# Patient Record
Sex: FEMALE | Race: ASIAN | NOT HISPANIC OR LATINO | URBAN - METROPOLITAN AREA
[De-identification: names, ages, dates, MRNs, and addresses within clinical notes are randomized per-mention and may not be internally consistent; named-entity substitution may affect disease eponyms.]

---

## 2019-06-06 ENCOUNTER — INPATIENT (INPATIENT)
Facility: HOSPITAL | Age: 49
LOS: 0 days | Discharge: HOME | DRG: 176 | End: 2019-06-06
Attending: INTERNAL MEDICINE | Admitting: INTERNAL MEDICINE
Payer: MEDICAID

## 2019-06-06 VITALS
TEMPERATURE: 98 F | OXYGEN SATURATION: 100 % | DIASTOLIC BLOOD PRESSURE: 88 MMHG | HEART RATE: 74 BPM | RESPIRATION RATE: 17 BRPM | SYSTOLIC BLOOD PRESSURE: 120 MMHG

## 2019-06-06 VITALS
OXYGEN SATURATION: 100 % | HEART RATE: 86 BPM | RESPIRATION RATE: 16 BRPM | HEIGHT: 62 IN | DIASTOLIC BLOOD PRESSURE: 87 MMHG | TEMPERATURE: 98 F | SYSTOLIC BLOOD PRESSURE: 143 MMHG | WEIGHT: 151.9 LBS

## 2019-06-06 DIAGNOSIS — I26.99 OTHER PULMONARY EMBOLISM WITHOUT ACUTE COR PULMONALE: ICD-10-CM

## 2019-06-06 DIAGNOSIS — Z29.9 ENCOUNTER FOR PROPHYLACTIC MEASURES, UNSPECIFIED: ICD-10-CM

## 2019-06-06 DIAGNOSIS — E11.9 TYPE 2 DIABETES MELLITUS WITHOUT COMPLICATIONS: ICD-10-CM

## 2019-06-06 LAB
ALBUMIN SERPL ELPH-MCNC: 3.4 G/DL — LOW (ref 3.5–5)
ALP SERPL-CCNC: 67 U/L — SIGNIFICANT CHANGE UP (ref 40–120)
ALT FLD-CCNC: 36 U/L DA — SIGNIFICANT CHANGE UP (ref 10–60)
ANION GAP SERPL CALC-SCNC: 6 MMOL/L — SIGNIFICANT CHANGE UP (ref 5–17)
APTT BLD: 31 SEC — SIGNIFICANT CHANGE UP (ref 27.5–36.3)
APTT BLD: 32 SEC — SIGNIFICANT CHANGE UP (ref 27.5–36.3)
AST SERPL-CCNC: 34 U/L — SIGNIFICANT CHANGE UP (ref 10–40)
BILIRUB SERPL-MCNC: 0.4 MG/DL — SIGNIFICANT CHANGE UP (ref 0.2–1.2)
BUN SERPL-MCNC: 13 MG/DL — SIGNIFICANT CHANGE UP (ref 7–18)
CALCIUM SERPL-MCNC: 8.4 MG/DL — SIGNIFICANT CHANGE UP (ref 8.4–10.5)
CHLORIDE SERPL-SCNC: 107 MMOL/L — SIGNIFICANT CHANGE UP (ref 96–108)
CO2 SERPL-SCNC: 23 MMOL/L — SIGNIFICANT CHANGE UP (ref 22–31)
CREAT SERPL-MCNC: 0.76 MG/DL — SIGNIFICANT CHANGE UP (ref 0.5–1.3)
D DIMER BLD IA.RAPID-MCNC: 830 NG/ML DDU — HIGH
GLUCOSE SERPL-MCNC: 134 MG/DL — HIGH (ref 70–99)
HCG SERPL-ACNC: <1 MIU/ML — SIGNIFICANT CHANGE UP
HCT VFR BLD CALC: 39.1 % — SIGNIFICANT CHANGE UP (ref 34.5–45)
HGB BLD-MCNC: 12.4 G/DL — SIGNIFICANT CHANGE UP (ref 11.5–15.5)
INR BLD: 1.04 RATIO — SIGNIFICANT CHANGE UP (ref 0.88–1.16)
INR BLD: 1.1 RATIO — SIGNIFICANT CHANGE UP (ref 0.88–1.16)
MCHC RBC-ENTMCNC: 25.8 PG — LOW (ref 27–34)
MCHC RBC-ENTMCNC: 31.7 GM/DL — LOW (ref 32–36)
MCV RBC AUTO: 81.3 FL — SIGNIFICANT CHANGE UP (ref 80–100)
NRBC # BLD: 0 /100 WBCS — SIGNIFICANT CHANGE UP (ref 0–0)
PLATELET # BLD AUTO: 212 K/UL — SIGNIFICANT CHANGE UP (ref 150–400)
POTASSIUM SERPL-MCNC: 4.4 MMOL/L — SIGNIFICANT CHANGE UP (ref 3.5–5.3)
POTASSIUM SERPL-SCNC: 4.4 MMOL/L — SIGNIFICANT CHANGE UP (ref 3.5–5.3)
PROT SERPL-MCNC: 7 G/DL — SIGNIFICANT CHANGE UP (ref 6–8.3)
PROTHROM AB SERPL-ACNC: 11.6 SEC — SIGNIFICANT CHANGE UP (ref 10–12.9)
PROTHROM AB SERPL-ACNC: 12.2 SEC — SIGNIFICANT CHANGE UP (ref 10–12.9)
RBC # BLD: 4.81 M/UL — SIGNIFICANT CHANGE UP (ref 3.8–5.2)
RBC # FLD: 13 % — SIGNIFICANT CHANGE UP (ref 10.3–14.5)
SODIUM SERPL-SCNC: 136 MMOL/L — SIGNIFICANT CHANGE UP (ref 135–145)
TROPONIN I SERPL-MCNC: 0.02 NG/ML — SIGNIFICANT CHANGE UP (ref 0–0.04)
WBC # BLD: 6.97 K/UL — SIGNIFICANT CHANGE UP (ref 3.8–10.5)
WBC # FLD AUTO: 6.97 K/UL — SIGNIFICANT CHANGE UP (ref 3.8–10.5)

## 2019-06-06 PROCEDURE — 93010 ELECTROCARDIOGRAM REPORT: CPT

## 2019-06-06 PROCEDURE — 99285 EMERGENCY DEPT VISIT HI MDM: CPT

## 2019-06-06 PROCEDURE — 93970 EXTREMITY STUDY: CPT | Mod: 26

## 2019-06-06 PROCEDURE — 71275 CT ANGIOGRAPHY CHEST: CPT | Mod: 26

## 2019-06-06 RX ORDER — HEPARIN SODIUM 5000 [USP'U]/ML
5500 INJECTION INTRAVENOUS; SUBCUTANEOUS EVERY 6 HOURS
Refills: 0 | Status: DISCONTINUED | OUTPATIENT
Start: 2019-06-06 | End: 2019-06-06

## 2019-06-06 RX ORDER — HEPARIN SODIUM 5000 [USP'U]/ML
5500 INJECTION INTRAVENOUS; SUBCUTANEOUS ONCE
Refills: 0 | Status: DISCONTINUED | OUTPATIENT
Start: 2019-06-06 | End: 2019-06-06

## 2019-06-06 RX ORDER — HEPARIN SODIUM 5000 [USP'U]/ML
INJECTION INTRAVENOUS; SUBCUTANEOUS
Qty: 25000 | Refills: 0 | Status: DISCONTINUED | OUTPATIENT
Start: 2019-06-06 | End: 2019-06-06

## 2019-06-06 RX ORDER — SITAGLIPTIN AND METFORMIN HYDROCHLORIDE 500; 50 MG/1; MG/1
1 TABLET, FILM COATED ORAL
Qty: 0 | Refills: 0 | DISCHARGE

## 2019-06-06 RX ORDER — HEPARIN SODIUM 5000 [USP'U]/ML
2500 INJECTION INTRAVENOUS; SUBCUTANEOUS EVERY 6 HOURS
Refills: 0 | Status: DISCONTINUED | OUTPATIENT
Start: 2019-06-06 | End: 2019-06-06

## 2019-06-06 RX ORDER — INSULIN LISPRO 100/ML
VIAL (ML) SUBCUTANEOUS
Refills: 0 | Status: DISCONTINUED | OUTPATIENT
Start: 2019-06-06 | End: 2019-06-06

## 2019-06-06 NOTE — ED ADULT NURSE NOTE - OBJECTIVE STATEMENT
As per patient " I feel short of breath and my back hurts started yesterday. I have Endovenous Laser Therapy on my right leg last week ". "My doctor told me to come to emergency room " Upon assessment, pt resting in bed, EKG completed, no acute distress noted, denies chest pain, no shortness of breath indicated at this time. Safety maintained.

## 2019-06-06 NOTE — H&P ADULT - PROBLEM SELECTOR PLAN 1
CT chest shows PE likely secondary to recent varicose vein surgery  patient is currently refusing heparin drip and leaving against medical advice  ICU resident and I have explained in detail to patient regarding her medical condition. explained her the severity of medical condition

## 2019-06-06 NOTE — H&P ADULT - NSHPPHYSICALEXAM_GEN_ALL_CORE
ICU Vital Signs Last 24 Hrs  T(C): 36.6 (06 Jun 2019 17:22), Max: 36.6 (06 Jun 2019 17:22)  T(F): 97.9 (06 Jun 2019 17:22), Max: 97.9 (06 Jun 2019 17:22)  HR: 83 (06 Jun 2019 17:22) (83 - 86)  BP: 140/79 (06 Jun 2019 17:22) (140/79 - 143/87)  BP(mean): --  ABP: --  ABP(mean): --  RR: 16 (06 Jun 2019 17:22) (16 - 16)  SpO2: 97% (06 Jun 2019 17:22) (97% - 100%)    PHYSICAL EXAM:  GENERAL: NAD, obese  HEAD:  Atraumatic, Normocephalic  EYES:  conjunctiva and sclera clear  NECK: Supple, No JVD, Normal thyroid  CHEST/LUNG: Clear to auscultation. Clear to percussion bilaterally; No rales, rhonchi, wheezing, or rubs  HEART: Regular rate and rhythm; No murmurs, rubs, or gallops  ABDOMEN: Soft, Nontender, Nondistended; Bowel sounds present  NERVOUS SYSTEM:  Alert & Oriented X3,    EXTREMITIES: B/l varicose veins,   2+ Peripheral Pulses, No clubbing, cyanosis, or edema  SKIN: warm dry

## 2019-06-06 NOTE — ED ADULT NURSE NOTE - NSIMPLEMENTINTERV_GEN_ALL_ED
Implemented All Universal Safety Interventions:  [place] to call system. Call bell, personal items and telephone within reach. Instruct patient to call for assistance. Room bathroom lighting operational. Non-slip footwear when patient is off stretcher. Physically safe environment: no spills, clutter or unnecessary equipment. Stretcher in lowest position, wheels locked, appropriate side rails in place.

## 2019-06-06 NOTE — ED ADULT TRIAGE NOTE - CHIEF COMPLAINT QUOTE
" I feel short of breath and my back hurts started yesterday. I have Endovenous Laser Therapy on my right leg last week . My doctor told me to come to emergency room "

## 2019-06-06 NOTE — ED PROVIDER NOTE - PROGRESS NOTE DETAILS
Pt made aware of CT results, need for admission, anticoagulation. D/w  who requested ICU consult. Pt in NAD, denies any symptoms. No SOB, dizziness ,palpitations or pain.

## 2019-06-06 NOTE — ED PROVIDER NOTE - OBJECTIVE STATEMENT
49 y/o F with a significant PMHx of DM and varicose veins presents to the ED with intermittent shortness of breath and mid back pain x 2 days. Patient states she was treated for varicose veins in R leg one week ago. Patient is sent by PMD for exam to rule out PE; patient relates no symptoms currently. Denies history of blood clots, CAD, fever, chills, cough or any other acute complaints.

## 2019-06-06 NOTE — ED ADULT NURSE NOTE - EXTENSIONS OF SELF_ADULT
Problem: Falls - Risk of  Goal: Absence of falls  Outcome: Ongoing      Problem: Fluid Volume - Imbalance:  Goal: Will show no signs and symptoms of excessive bleeding  Will show no signs and symptoms of excessive bleeding   Outcome: Ongoing    Goal: Absence of imbalanced fluid volume signs and symptoms  Absence of imbalanced fluid volume signs and symptoms   Outcome: Ongoing None

## 2019-06-06 NOTE — DISCHARGE NOTE PROVIDER - NSDCCPCAREPLAN_GEN_ALL_CORE_FT
PRINCIPAL DISCHARGE DIAGNOSIS  Diagnosis: Pulmonary embolism  Assessment and Plan of Treatment: CT chest shows PE patient is leaving against medical advice

## 2019-06-06 NOTE — CONSULT NOTE ADULT - ASSESSMENT
48 F with history of varicose vein and DM presented for back pain and sob. Pt has CTA with evidence of pulmonary embolism in ED. Patient is vitally stable and saturating 100% on room air. Denied chest pain, palpitation, sob, and diaphoresis. Patient stated that she has something very important to do in the morning and she does not want to take any medication since she is not staying in the hospital anyhow. Patient shows understanding of medical condition she is having.     1. Pulmonary embolism with right heart strain   recommend echo and anticoagulation with Lovenox   tele monitor  will downgrade patient to tele   oxygen support, pain control   venous doppler   reconsult ICU as needed

## 2019-06-06 NOTE — CONSULT NOTE ADULT - SUBJECTIVE AND OBJECTIVE BOX
48 F with history of varicose vein and DM presented for back pain and sob. Pt has CTA with evidence of pulmonary embolism in ED. Patient is vitally stable and saturating 100% on room air. Denied chest pain, palpitation, sob, and diaphoresis. Patient stated that she has something very important to do in the morning and she does not want to take any medication since she is not staying in the hospital anyhow. Patient shows understanding of medical condition she is having.     REVIEW OF SYSTEMS:    CONSTITUTIONAL: No weakness, fevers or chills  EYES/ENT: No visual changes;  No vertigo or throat pain   NECK: No pain or stiffness  RESPIRATORY: No cough, wheezing, hemoptysis; + shortness of breath  CARDIOVASCULAR: No chest pain or palpitations  GASTROINTESTINAL: No abdominal or epigastric pain. No nausea, vomiting, or hematemesis; No diarrhea or constipation. No melena or hematochezia.  GENITOURINARY: No dysuria, frequency or hematuria  NEUROLOGICAL: No numbness or weakness  SKIN: No itching, rashes  No other complaint except mentioned as above.     PHYSICAL EXAM:    GENERAL: NAD, well-developed    HEAD:  Atraumatic, Normocephalic    EYES: EOMI, PERRLA, conjunctiva and sclera clear    NECK: Supple, No JVD    CHEST/LUNG: Clear to auscultation bilaterally; No wheeze    HEART: Regular rate and rhythm; No murmurs, rubs, or gallops    ABDOMEN: Soft, Nontender, Nondistended; Bowel sounds present    EXTREMITIES:  2+ Peripheral Pulses, No clubbing, cyanosis, or edema    PSYCH: AAOx3    NEUROLOGY: non-focal    SKIN: No rashes or lesions    No other pertinent positive finding except mentioned as above.       PAST MEDICAL & SURGICAL HISTORY:  Varicose veins  DM (diabetes mellitus)  No significant past surgical history      No Known Allergies      Meds:  heparin  Infusion.  Unit(s)/Hr IV Continuous <Continuous>  heparin  Injectable 5500 Unit(s) IV Push every 6 hours PRN  heparin  Injectable 2500 Unit(s) IV Push every 6 hours PRN  heparin  Injectable 5500 Unit(s) IV Push once  insulin lispro (HumaLOG) corrective regimen sliding scale   SubCutaneous three times a day before meals      SOCIAL HISTORY:  Smoker:  YES / NO        PACK YEARS:                         WHEN QUIT?  ETOH use:  YES / NO               FREQUENCY / QUANTITY:  Ilicit Drug use:  YES / NO  Occupation:  Assisted device use (Cane / Walker):  Live with:    FAMILY HISTORY:      VITALS:  Vital Signs Last 24 Hrs  T(C): 36.6 (06 Jun 2019 17:22), Max: 36.6 (06 Jun 2019 17:22)  T(F): 97.9 (06 Jun 2019 17:22), Max: 97.9 (06 Jun 2019 17:22)  HR: 83 (06 Jun 2019 17:22) (83 - 86)  BP: 140/79 (06 Jun 2019 17:22) (140/79 - 143/87)  BP(mean): --  RR: 16 (06 Jun 2019 17:22) (16 - 16)  SpO2: 97% (06 Jun 2019 17:22) (97% - 100%)    LABS/DIAGNOSTIC TESTS:                          12.4   6.97  )-----------( 212      ( 06 Jun 2019 12:41 )             39.1     WBC Count: 6.97 K/uL (06-06 @ 12:41)      06-06    136  |  107  |  13  ----------------------------<  134<H>  4.4   |  23  |  0.76    Ca    8.4      06 Jun 2019 12:41    TPro  7.0  /  Alb  3.4<L>  /  TBili  0.4  /  DBili  x   /  AST  34  /  ALT  36  /  AlkPhos  67  06-06          LIVER FUNCTIONS - ( 06 Jun 2019 12:41 )  Alb: 3.4 g/dL / Pro: 7.0 g/dL / ALK PHOS: 67 U/L / ALT: 36 U/L DA / AST: 34 U/L / GGT: x             PT/INR - ( 06 Jun 2019 17:34 )   PT: 12.2 sec;   INR: 1.10 ratio         PTT - ( 06 Jun 2019 17:34 )  PTT:32.0 sec

## 2019-06-06 NOTE — ED PROVIDER NOTE - CLINICAL SUMMARY MEDICAL DECISION MAKING FREE TEXT BOX
47 y/o F presents with shortness of breath and back pain. Patient with recent vein procedure. Will obtain labs and CTA to rule out PE; patient in no acute distress.

## 2019-06-06 NOTE — DISCHARGE NOTE PROVIDER - HOSPITAL COURSE
48 F with PMH varicose vein ( recent stripping on wednesday) and DM presented for back pain and sob. She states she was sent by PMD for CTA for concern of embolism. She denies fever, chills, headache, dizziness, syncope, palpitaions or any other complains.         In ED, Pt has CTA with evidence of pulmonary embolism with right heart strain. Patient is vitally stable and saturating 100% on room air. ICU was consulted initially. Heparin drip was ordered but on my encounter, Patient stated that she has something very important to do in the morning and she does not want to take any medication since she is not staying in the hospital anyhow. Patient verbalized understanding of severity of her medical condition and that it can lead to death if she will not be started on heparin but she is adamant to leave hospital. Patient is advised to come to hospital immediately if patient develop worsening of shortness of breath. Patient verbalized understanding. Patient is leaving against medical advice.

## 2019-06-06 NOTE — H&P ADULT - ASSESSMENT
48 F with PMH varicose vein ( recent stripping on wednesday) and DM presented for back pain and sob. She states she was sent by PMD for CTA for concern of embolism. She denies fever, chills, headache, dizziness, syncope, palpitaions or any other complains.     Patient was admitted for PE but now leaving against medical advise

## 2019-06-06 NOTE — H&P ADULT - PROBLEM SELECTOR PLAN 3
.     RISK                                                          Points  [  ] Previous VTE                                                3  [  ] Thrombophilia                                             2  [  ] Lower limb paralysis                                   2        (unable to hold up >15 seconds)    [  ] Current Cancer                                             2         (within 6 months)  [ x ] Immobilization > 24 hrs                              1  [  ] ICU/CCU stay > 24 hours                             1  [ x ] Age > 60                                                         1    IMPROVE VTE Score: 2  lovenox for VTE prophylaxis.

## 2019-06-06 NOTE — CONSULT NOTE ADULT - ATTENDING COMMENTS
Patient is a 47 y/o female with PMH of DM and varicose veins. She presented with a chief complaint of SOB and her CT angiogram of the chest shows filling defects in the right and left main pulmonary arteries right >than left. The patient's vital signs are stable ( /60, HR 80 ) but the CT shows straightening of the septum and enlarged RV possibly consistent with RV strain. The serum troponin however is only 0.016ng/ml. I agree with the resident that the patient can be managed on telemetry. Dx- submassive pulmonary embolism, recommend lovenox Rx. Doppler study of the lower extremities negative for DVT. Critical care time 40 minutes Patient is a 47 y/o female with PMH of DM and varicose veins. She presented with a chief complaint of SOB and her CT angiogram of the chest shows filling defects in the right and left main pulmonary arteries right >than left. The patient's vital signs are stable ( /60, HR 80 ) but the CT shows straightening of the septum and enlarged RV possibly consistent with RV strain. The serum troponin however is only 0.016ng/ml. I agree with the resident that the patient can be managed on telemetry. Dx- submassive pulmonary embolism, recommend lovenox Rx. Doppler study of the lower extremities negative for DVT. Unfortunately the patient has signed out AMA.  Critical care time 40 minutes

## 2019-06-06 NOTE — H&P ADULT - HISTORY OF PRESENT ILLNESS
48 F with PMH varicose vein ( recent stripping on wednesday) and DM presented for back pain and sob. She states she was sent by PMD for CTA for concern of embolism. She denies fever, chills, headache, dizziness, syncope, palpitaions or any other complains.     In ED, Pt has CTA with evidence of pulmonary embolism with right heart strain. Patient is vitally stable and saturating 100% on room air. ICU was consulted initially. Heparin drip was ordered but on my encounter, Patient stated that she has something very important to do in the morning and she does not want to take any medication since she is not staying in the hospital anyhow. Patient verbalized understanding of severity of her medical condition and that it can lead to death if she will not be started on heparin but she is adamant to leave hospital

## 2019-07-10 PROCEDURE — 36415 COLL VENOUS BLD VENIPUNCTURE: CPT

## 2019-07-10 PROCEDURE — 99285 EMERGENCY DEPT VISIT HI MDM: CPT | Mod: 25

## 2019-07-10 PROCEDURE — 85730 THROMBOPLASTIN TIME PARTIAL: CPT

## 2019-07-10 PROCEDURE — 84702 CHORIONIC GONADOTROPIN TEST: CPT

## 2019-07-10 PROCEDURE — 85027 COMPLETE CBC AUTOMATED: CPT

## 2019-07-10 PROCEDURE — 85379 FIBRIN DEGRADATION QUANT: CPT

## 2019-07-10 PROCEDURE — 80053 COMPREHEN METABOLIC PANEL: CPT

## 2019-07-10 PROCEDURE — 93970 EXTREMITY STUDY: CPT

## 2019-07-10 PROCEDURE — 85610 PROTHROMBIN TIME: CPT

## 2019-07-10 PROCEDURE — 84484 ASSAY OF TROPONIN QUANT: CPT

## 2019-07-10 PROCEDURE — 93005 ELECTROCARDIOGRAM TRACING: CPT

## 2019-07-10 PROCEDURE — 71275 CT ANGIOGRAPHY CHEST: CPT

## 2019-08-05 ENCOUNTER — EMERGENCY (EMERGENCY)
Facility: HOSPITAL | Age: 49
LOS: 1 days | Discharge: HOME | End: 2019-08-05
Attending: EMERGENCY MEDICINE
Payer: MEDICAID

## 2019-08-05 VITALS
WEIGHT: 151.9 LBS | SYSTOLIC BLOOD PRESSURE: 111 MMHG | TEMPERATURE: 98 F | RESPIRATION RATE: 20 BRPM | DIASTOLIC BLOOD PRESSURE: 75 MMHG | OXYGEN SATURATION: 100 % | HEART RATE: 89 BPM | HEIGHT: 62 IN

## 2019-08-05 PROCEDURE — 73564 X-RAY EXAM KNEE 4 OR MORE: CPT

## 2019-08-05 PROCEDURE — 99283 EMERGENCY DEPT VISIT LOW MDM: CPT | Mod: 25

## 2019-08-05 PROCEDURE — 99283 EMERGENCY DEPT VISIT LOW MDM: CPT

## 2019-08-05 PROCEDURE — 73564 X-RAY EXAM KNEE 4 OR MORE: CPT | Mod: 26,LT

## 2019-08-05 RX ORDER — ACETAMINOPHEN 500 MG
975 TABLET ORAL ONCE
Refills: 0 | Status: COMPLETED | OUTPATIENT
Start: 2019-08-05 | End: 2019-08-05
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
## 2019-08-05 NOTE — ED PROVIDER NOTE - ATTENDING CONTRIBUTION TO CARE
I completed an independent physical examination.   I have signed out the follow up of any pending tests (i.e. labs, radiological studies) to the PA/NP.  I have discussed the patient’s plan of care and disposition with the PA/NP    Patient with knee pain, Analgesia and ortho follow up.

## 2019-08-05 NOTE — ED PROVIDER NOTE - CLINICAL SUMMARY MEDICAL DECISION MAKING FREE TEXT BOX
Based on exam and history likely MCL sprain vs tear, will xray, give analgesia, refer to ortho for MRI

## 2019-08-06 PROBLEM — I83.90 ASYMPTOMATIC VARICOSE VEINS OF UNSPECIFIED LOWER EXTREMITY: Chronic | Status: ACTIVE | Noted: 2019-06-06

## 2019-08-06 PROBLEM — E11.9 TYPE 2 DIABETES MELLITUS WITHOUT COMPLICATIONS: Chronic | Status: ACTIVE | Noted: 2019-06-06

## 2021-09-07 NOTE — ED PROVIDER NOTE - LOCATION
Attempted to call pt concerning her injection she had on 08/31. Pt's daughter states she is doing ok. But was unable to give any more information. Daughter was not with her mom at this time. No further questions at this time.
knee

## 2022-03-17 ENCOUNTER — EMERGENCY (EMERGENCY)
Facility: HOSPITAL | Age: 52
LOS: 1 days | Discharge: HOME | End: 2022-03-17
Attending: STUDENT IN AN ORGANIZED HEALTH CARE EDUCATION/TRAINING PROGRAM
Payer: MEDICAID

## 2022-03-17 VITALS
HEART RATE: 73 BPM | RESPIRATION RATE: 18 BRPM | SYSTOLIC BLOOD PRESSURE: 118 MMHG | DIASTOLIC BLOOD PRESSURE: 72 MMHG | OXYGEN SATURATION: 98 %

## 2022-03-17 VITALS
OXYGEN SATURATION: 99 % | DIASTOLIC BLOOD PRESSURE: 72 MMHG | TEMPERATURE: 98 F | SYSTOLIC BLOOD PRESSURE: 112 MMHG | WEIGHT: 151.9 LBS | HEIGHT: 62 IN | RESPIRATION RATE: 17 BRPM | HEART RATE: 72 BPM

## 2022-03-17 LAB
ALBUMIN SERPL ELPH-MCNC: 3.5 G/DL — SIGNIFICANT CHANGE UP (ref 3.5–5)
ALP SERPL-CCNC: 49 U/L — SIGNIFICANT CHANGE UP (ref 40–120)
ALT FLD-CCNC: 37 U/L DA — SIGNIFICANT CHANGE UP (ref 10–60)
ANION GAP SERPL CALC-SCNC: 6 MMOL/L — SIGNIFICANT CHANGE UP (ref 5–17)
AST SERPL-CCNC: 24 U/L — SIGNIFICANT CHANGE UP (ref 10–40)
BASOPHILS # BLD AUTO: 0.06 K/UL — SIGNIFICANT CHANGE UP (ref 0–0.2)
BASOPHILS NFR BLD AUTO: 0.8 % — SIGNIFICANT CHANGE UP (ref 0–2)
BILIRUB SERPL-MCNC: 0.2 MG/DL — SIGNIFICANT CHANGE UP (ref 0.2–1.2)
BUN SERPL-MCNC: 18 MG/DL — SIGNIFICANT CHANGE UP (ref 7–18)
CALCIUM SERPL-MCNC: 8.7 MG/DL — SIGNIFICANT CHANGE UP (ref 8.4–10.5)
CHLORIDE SERPL-SCNC: 106 MMOL/L — SIGNIFICANT CHANGE UP (ref 96–108)
CO2 SERPL-SCNC: 26 MMOL/L — SIGNIFICANT CHANGE UP (ref 22–31)
CREAT SERPL-MCNC: 0.99 MG/DL — SIGNIFICANT CHANGE UP (ref 0.5–1.3)
EGFR: 69 ML/MIN/1.73M2 — SIGNIFICANT CHANGE UP
EOSINOPHIL # BLD AUTO: 0.15 K/UL — SIGNIFICANT CHANGE UP (ref 0–0.5)
EOSINOPHIL NFR BLD AUTO: 1.9 % — SIGNIFICANT CHANGE UP (ref 0–6)
GLUCOSE SERPL-MCNC: 154 MG/DL — HIGH (ref 70–99)
HCG SERPL-ACNC: <1 MIU/ML — SIGNIFICANT CHANGE UP
HCT VFR BLD CALC: 36.6 % — SIGNIFICANT CHANGE UP (ref 34.5–45)
HGB BLD-MCNC: 12.1 G/DL — SIGNIFICANT CHANGE UP (ref 11.5–15.5)
IMM GRANULOCYTES NFR BLD AUTO: 0.3 % — SIGNIFICANT CHANGE UP (ref 0–1.5)
LYMPHOCYTES # BLD AUTO: 3.31 K/UL — HIGH (ref 1–3.3)
LYMPHOCYTES # BLD AUTO: 42.3 % — SIGNIFICANT CHANGE UP (ref 13–44)
MCHC RBC-ENTMCNC: 26.2 PG — LOW (ref 27–34)
MCHC RBC-ENTMCNC: 33.1 GM/DL — SIGNIFICANT CHANGE UP (ref 32–36)
MCV RBC AUTO: 79.2 FL — LOW (ref 80–100)
MONOCYTES # BLD AUTO: 0.76 K/UL — SIGNIFICANT CHANGE UP (ref 0–0.9)
MONOCYTES NFR BLD AUTO: 9.7 % — SIGNIFICANT CHANGE UP (ref 2–14)
NEUTROPHILS # BLD AUTO: 3.52 K/UL — SIGNIFICANT CHANGE UP (ref 1.8–7.4)
NEUTROPHILS NFR BLD AUTO: 45 % — SIGNIFICANT CHANGE UP (ref 43–77)
NRBC # BLD: 0 /100 WBCS — SIGNIFICANT CHANGE UP (ref 0–0)
NT-PROBNP SERPL-SCNC: 15 PG/ML — SIGNIFICANT CHANGE UP (ref 0–125)
PLATELET # BLD AUTO: 253 K/UL — SIGNIFICANT CHANGE UP (ref 150–400)
POTASSIUM SERPL-MCNC: 4.1 MMOL/L — SIGNIFICANT CHANGE UP (ref 3.5–5.3)
POTASSIUM SERPL-SCNC: 4.1 MMOL/L — SIGNIFICANT CHANGE UP (ref 3.5–5.3)
PROT SERPL-MCNC: 6.9 G/DL — SIGNIFICANT CHANGE UP (ref 6–8.3)
RBC # BLD: 4.62 M/UL — SIGNIFICANT CHANGE UP (ref 3.8–5.2)
RBC # FLD: 13.2 % — SIGNIFICANT CHANGE UP (ref 10.3–14.5)
SARS-COV-2 RNA SPEC QL NAA+PROBE: SIGNIFICANT CHANGE UP
SODIUM SERPL-SCNC: 138 MMOL/L — SIGNIFICANT CHANGE UP (ref 135–145)
TROPONIN I, HIGH SENSITIVITY RESULT: 3.3 NG/L — SIGNIFICANT CHANGE UP
WBC # BLD: 7.82 K/UL — SIGNIFICANT CHANGE UP (ref 3.8–10.5)
WBC # FLD AUTO: 7.82 K/UL — SIGNIFICANT CHANGE UP (ref 3.8–10.5)

## 2022-03-17 PROCEDURE — 99285 EMERGENCY DEPT VISIT HI MDM: CPT | Mod: 25

## 2022-03-17 PROCEDURE — 87635 SARS-COV-2 COVID-19 AMP PRB: CPT

## 2022-03-17 PROCEDURE — 93010 ELECTROCARDIOGRAM REPORT: CPT

## 2022-03-17 PROCEDURE — 85025 COMPLETE CBC W/AUTO DIFF WBC: CPT

## 2022-03-17 PROCEDURE — 71275 CT ANGIOGRAPHY CHEST: CPT | Mod: 26,MA

## 2022-03-17 PROCEDURE — 84702 CHORIONIC GONADOTROPIN TEST: CPT

## 2022-03-17 PROCEDURE — 36415 COLL VENOUS BLD VENIPUNCTURE: CPT

## 2022-03-17 PROCEDURE — 80053 COMPREHEN METABOLIC PANEL: CPT

## 2022-03-17 PROCEDURE — 71275 CT ANGIOGRAPHY CHEST: CPT | Mod: MA

## 2022-03-17 PROCEDURE — 83880 ASSAY OF NATRIURETIC PEPTIDE: CPT

## 2022-03-17 PROCEDURE — 99285 EMERGENCY DEPT VISIT HI MDM: CPT

## 2022-03-17 PROCEDURE — 93005 ELECTROCARDIOGRAM TRACING: CPT

## 2022-03-17 PROCEDURE — 84484 ASSAY OF TROPONIN QUANT: CPT
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
## 2022-03-17 NOTE — ED PROVIDER NOTE - NS ED ROS FT
ROS:  GENERAL: No fever, no chills  EYES: no change in vision  HEENT: no trouble swallowing, no trouble speaking  CARDIAC: no chest pain  PULMONARY: +SOB. no cough   GI: no abdominal pain, no nausea, no vomiting, no diarrhea, no constipation  : No dysuria, no frequency, no change in appearance, or odor of urine  SKIN: no rashes  NEURO: no headache, no weakness  MSK: No joint pain    Rodrigo Patino, DO

## 2022-03-17 NOTE — ED PROVIDER NOTE - PHYSICAL EXAMINATION
Gen: AAOx3, non-toxic  Head: NCAT  HEENT: EOMI, oral mucosa moist, normal conjunctiva  Lung: CTAB, no respiratory distress, no wheezes/rhonchi/rales B/L, speaking in full sentences  CV: RRR, no murmurs, rubs or gallops  Abd: soft, NTND, no guarding, no CVA tenderness  MSK: no visible deformities  Neuro: No focal sensory or motor deficits, normal CN exam   Skin: Warm, well perfused, no rash, no LE edema or calf tenderness   Psych: normal affect.     Rodrigo Patino, DO
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
## 2022-03-17 NOTE — ED PROVIDER NOTE - PATIENT PORTAL LINK FT
You can access the FollowMyHealth Patient Portal offered by [facility] by registering at the following website: [email or web address]. By joining [facility]’s FollowMyHealth portal, you will also be able to view your health information using other applications (apps) compatible with our system.

## 2023-03-02 NOTE — ED ADULT NURSE NOTE - PRIMARY CARE PROVIDER
MD Wolfe
03-02    141  |  110<H>  |  29<H>  ----------------------------<  188<H>  3.9   |  26  |  1.22    Ca    8.6      02 Mar 2023 04:02  Phos  3.3     03-02  Mg     1.9     03-02    TPro  6.0  /  Alb  2.1<L>  /  TBili  0.4  /  DBili  x   /  AST  17  /  ALT  17  /  AlkPhos  77  03-02  POCT Blood Glucose.: 151 mg/dL (03-02-23 @ 12:01)

## 2024-05-16 ENCOUNTER — EMERGENCY (EMERGENCY)
Facility: HOSPITAL | Age: 54
LOS: 1 days | Discharge: HOME | End: 2024-05-16
Attending: EMERGENCY MEDICINE
Payer: MEDICAID

## 2024-05-16 VITALS
HEIGHT: 62 IN | RESPIRATION RATE: 16 BRPM | TEMPERATURE: 98 F | DIASTOLIC BLOOD PRESSURE: 85 MMHG | SYSTOLIC BLOOD PRESSURE: 141 MMHG | OXYGEN SATURATION: 100 % | WEIGHT: 149.03 LBS | HEART RATE: 82 BPM

## 2024-05-16 LAB
ALBUMIN SERPL ELPH-MCNC: 3.6 G/DL — SIGNIFICANT CHANGE UP (ref 3.5–5)
ALP SERPL-CCNC: 56 U/L — SIGNIFICANT CHANGE UP (ref 40–120)
ALT FLD-CCNC: 32 U/L DA — SIGNIFICANT CHANGE UP (ref 10–60)
ANION GAP SERPL CALC-SCNC: 3 MMOL/L — LOW (ref 5–17)
AST SERPL-CCNC: 25 U/L — SIGNIFICANT CHANGE UP (ref 10–40)
BASOPHILS # BLD AUTO: 0.04 K/UL — SIGNIFICANT CHANGE UP (ref 0–0.2)
BASOPHILS NFR BLD AUTO: 0.7 % — SIGNIFICANT CHANGE UP (ref 0–2)
BILIRUB SERPL-MCNC: 0.1 MG/DL — LOW (ref 0.2–1.2)
BUN SERPL-MCNC: 19 MG/DL — HIGH (ref 7–18)
CALCIUM SERPL-MCNC: 9.2 MG/DL — SIGNIFICANT CHANGE UP (ref 8.4–10.5)
CHLORIDE SERPL-SCNC: 111 MMOL/L — HIGH (ref 96–108)
CO2 SERPL-SCNC: 25 MMOL/L — SIGNIFICANT CHANGE UP (ref 22–31)
CREAT SERPL-MCNC: 0.81 MG/DL — SIGNIFICANT CHANGE UP (ref 0.5–1.3)
EGFR: 86 ML/MIN/1.73M2 — SIGNIFICANT CHANGE UP
EOSINOPHIL # BLD AUTO: 0.07 K/UL — SIGNIFICANT CHANGE UP (ref 0–0.5)
EOSINOPHIL NFR BLD AUTO: 1.2 % — SIGNIFICANT CHANGE UP (ref 0–6)
GLUCOSE SERPL-MCNC: 144 MG/DL — HIGH (ref 70–99)
HCT VFR BLD CALC: 38.4 % — SIGNIFICANT CHANGE UP (ref 34.5–45)
HGB BLD-MCNC: 12.2 G/DL — SIGNIFICANT CHANGE UP (ref 11.5–15.5)
IMM GRANULOCYTES NFR BLD AUTO: 0.5 % — SIGNIFICANT CHANGE UP (ref 0–0.9)
LYMPHOCYTES # BLD AUTO: 2.3 K/UL — SIGNIFICANT CHANGE UP (ref 1–3.3)
LYMPHOCYTES # BLD AUTO: 40 % — SIGNIFICANT CHANGE UP (ref 13–44)
MAGNESIUM SERPL-MCNC: 1.9 MG/DL — SIGNIFICANT CHANGE UP (ref 1.6–2.6)
MCHC RBC-ENTMCNC: 25.5 PG — LOW (ref 27–34)
MCHC RBC-ENTMCNC: 31.8 GM/DL — LOW (ref 32–36)
MCV RBC AUTO: 80.3 FL — SIGNIFICANT CHANGE UP (ref 80–100)
MONOCYTES # BLD AUTO: 0.63 K/UL — SIGNIFICANT CHANGE UP (ref 0–0.9)
MONOCYTES NFR BLD AUTO: 11 % — SIGNIFICANT CHANGE UP (ref 2–14)
NEUTROPHILS # BLD AUTO: 2.68 K/UL — SIGNIFICANT CHANGE UP (ref 1.8–7.4)
NEUTROPHILS NFR BLD AUTO: 46.6 % — SIGNIFICANT CHANGE UP (ref 43–77)
NRBC # BLD: 0 /100 WBCS — SIGNIFICANT CHANGE UP (ref 0–0)
PLATELET # BLD AUTO: 230 K/UL — SIGNIFICANT CHANGE UP (ref 150–400)
POTASSIUM SERPL-MCNC: 4.7 MMOL/L — SIGNIFICANT CHANGE UP (ref 3.5–5.3)
POTASSIUM SERPL-SCNC: 4.7 MMOL/L — SIGNIFICANT CHANGE UP (ref 3.5–5.3)
PROT SERPL-MCNC: 7.2 G/DL — SIGNIFICANT CHANGE UP (ref 6–8.3)
RBC # BLD: 4.78 M/UL — SIGNIFICANT CHANGE UP (ref 3.8–5.2)
RBC # FLD: 13.7 % — SIGNIFICANT CHANGE UP (ref 10.3–14.5)
SODIUM SERPL-SCNC: 139 MMOL/L — SIGNIFICANT CHANGE UP (ref 135–145)
WBC # BLD: 5.75 K/UL — SIGNIFICANT CHANGE UP (ref 3.8–10.5)
WBC # FLD AUTO: 5.75 K/UL — SIGNIFICANT CHANGE UP (ref 3.8–10.5)

## 2024-05-16 PROCEDURE — 85025 COMPLETE CBC W/AUTO DIFF WBC: CPT

## 2024-05-16 PROCEDURE — 99283 EMERGENCY DEPT VISIT LOW MDM: CPT

## 2024-05-16 PROCEDURE — 82962 GLUCOSE BLOOD TEST: CPT

## 2024-05-16 PROCEDURE — 99284 EMERGENCY DEPT VISIT MOD MDM: CPT

## 2024-05-16 PROCEDURE — 36415 COLL VENOUS BLD VENIPUNCTURE: CPT

## 2024-05-16 PROCEDURE — 83735 ASSAY OF MAGNESIUM: CPT

## 2024-05-16 PROCEDURE — 80053 COMPREHEN METABOLIC PANEL: CPT

## 2024-05-16 RX ORDER — SODIUM CHLORIDE 9 MG/ML
1000 INJECTION INTRAMUSCULAR; INTRAVENOUS; SUBCUTANEOUS ONCE
Refills: 0 | Status: COMPLETED | OUTPATIENT
Start: 2024-05-16 | End: 2024-05-16

## 2024-05-16 RX ADMIN — SODIUM CHLORIDE 1000 MILLILITER(S): 9 INJECTION INTRAMUSCULAR; INTRAVENOUS; SUBCUTANEOUS at 18:18

## 2024-05-16 NOTE — ED PROVIDER NOTE - CLINICAL SUMMARY MEDICAL DECISION MAKING FREE TEXT BOX
53-year-old female PMH DM2, HLD presenting to emergency department with 6 weeks of nonbloody diarrhea.  5-6 episodes per day, after she eats.  Has had negative stool studies with Dr. Trevino.  No fevers no chills no  travel no antibiotics.  Last colonoscopy 2024.  PE as above no focal abdominal tenderness do not suspect acute surgical intra-abdominal pathology.  Will obtain labs rule out electrolyte abnormality, IVF, reassess, dispo accordingly.

## 2024-05-16 NOTE — ED PROVIDER NOTE - ATTENDING APP SHARED VISIT CONTRIBUTION OF CARE
I was physically present for the E/M service provided. I agree with above history, physical, and plan which I have reviewed and edited where appropriate. I was physically present for the key portions of the service provided.    sexton: watery diarrhea over 6 weeks. seen MD and had neg stool studies. no abx use, no alcohol, no vomiting ,no abd pain. never seen a Gi doctor. no fever.     well appearing. abd s/nt/nd.    a/p: diarrhea- r/o dehydration vs lytes imbalance - labs, re-assess. if concerning then ct otherwise out PT GI

## 2024-05-16 NOTE — ED ADULT NURSE NOTE - CAS EDN DISCHARGE ASSESSMENT
CARDIOVERSION EDUCATION CHECK LIST       12/18/18 @ 11 AM   Report to Cardiology Waiting Room on 3rd floor of the hospital  · Do not eat or drink anything after 12 mn on the night before your procedure.  · Please do not wear makeup (especially mascara) when arriving for your procedure.    Medications:  · You may take your usual morning medications with a sip of water    FOLLOW-UP EKG TO BE DONE 1 WEEK AFTER CARDIOVERSION ON 12/27/18 AT [facility].    Directions to the Cardiology Waiting Room  If you park in the Parking Garage:  Take elevators to the 2nd floor  Walk up ramp and turn right by Gold Elevators  Take elevator to the 3rd floor  Upon exiting the elevator, turn away from the clinic areas  Walk long salgado around to front of hospital to area with windows overlooking [facility]  Check in at Reception Desk  OR  If family is dropping you off:  Have them drop you off at the front of the Hospital  (Near the ER, where all the flags are hung).  Take the E elevators to the 3rd floor.  Check in at the Reception Desk in the waiting room.    · YOU WILL BE GOING HOME THE SAME DAY AS YOUR PROCEDURE  · YOU WILL NEED SOMEONE TO DRIVE YOU HOME AFTER YOUR PROCEDURE   · YOUR PAIN DURING YOUR PROCEDURE WILL BE MANAGED BY THE ANESTHESIA TEAM      Any need to reschedule or cancel procedures, or any questions regarding your procedure should be addressed directly with the Arrhythmia Department Nurses at the following phone number: [phone]    THE ABOVE INSTRUCTIONS WERE GIVEN TO THE PATIENT VERBALLY AND THEY VERBALIZED UNDERSTANDING. THEY DO NOT REQUIRE ANY SPECIAL NEEDS AND DO NOT HAVE ANY LEARNING BARRIERS.        
Alert and oriented to person, place and time

## 2024-05-16 NOTE — ED PROVIDER NOTE - PRINCIPAL DIAGNOSIS
Received report from Chavo Oliver. Pt sitting up in cart talking to pt sitter. Pt has no complaints/requests at this time.    Diarrhea

## 2024-05-16 NOTE — ED PROVIDER NOTE - PATIENT PORTAL LINK FT
You can access the FollowMyHealth Patient Portal offered by [facility] by registering at the following website: [email or web address]. By joining [facility]’s FollowMyHealth portal, you will also be able to view your health information using other applications (apps) compatible with our system.

## 2024-05-16 NOTE — ED PROVIDER NOTE - NSFOLLOWUPINSTRUCTIONS_ED_ALL_ED_FT
1) Follow up with your doctor as discussed  2) Return to the ED immediately for new or worsening symptoms   3) Please continue to take any home medications as prescribed  4) Your test results from your ED visit were discussed with you prior to discharge  5) You were provided with a copy of your test results    Diarrhea, Adult  Diarrhea is frequent loose and sometimes watery bowel movements. Diarrhea can make you feel weak and cause you to become dehydrated. Dehydration is a condition in which there is not enough water or other fluids in the body. Dehydration can make you tired and thirsty, cause you to have a dry mouth, and decrease how often you urinate.    Diarrhea typically lasts 2–3 days. However, it can last longer if it is a sign of something more serious. It is important to treat your diarrhea as told by your health care provider.    Follow these instructions at home:  Eating and drinking    A bottle of clear fruit juice and glass of water.   Bread, rice, and cereal from the grain group.  Follow these recommendations as told by your health care provider:  Take an oral rehydration solution (ORS). This is an over-the-counter medicine that helps return your body to its normal balance of nutrients and water. It is found at pharmacies and retail stores.  Drink enough fluid to keep your urine pale yellow.  Drink fluids such as water, diluted fruit juice, and low-calorie sports drinks. You can drink milk also, if desired. Sucking on ice chips is another way to get fluids.  Avoid drinking fluids that contain a lot of sugar or caffeine, such as soda, energy drinks, and regular sports drinks.  Avoid alcohol.  Eat bland, easy-to-digest foods in small amounts as you are able. These foods include bananas, applesauce, rice, lean meats, toast, and crackers.  Avoid spicy or fatty foods.  Medicines    Take over-the-counter and prescription medicines only as told by your health care provider.  If you were prescribed antibiotics, take them as told by your health care provider. Do not stop using the antibiotic even if you start to feel better.  General instructions    Washing hands with soap and water.  Wash your hands often using soap and water for at least 20 seconds. If soap and water are not available, use hand . Others in the household should wash their hands as well. Hands should be washed:  After using the toilet or changing a diaper.  Before preparing, cooking, or serving food.  While caring for a sick person or while visiting someone in a hospital.  Rest at home while you recover.  Take a warm bath to relieve any burning or pain from frequent diarrhea episodes.  Watch your condition for any changes.  Contact a health care provider if:  You have a fever.  Your diarrhea gets worse.  You have new symptoms.  You vomit every time you eat or drink.  You feel light-headed, dizzy, or have a headache.  You have muscle cramps.  You have signs of dehydration, such as:  Dark urine, very little urine, or no urine.  Cracked lips.  Dry mouth.  Sunken eyes.  Sleepiness.  Weakness.  You have bloody or black stools or stools that look like tar.  You have severe pain, cramping, or bloating in your abdomen.  Your skin feels cold and clammy.  You feel confused.  Get help right away if:  You have chest pain or your heart is beating very quickly.  You have trouble breathing or you are breathing very quickly.  You feel extremely weak or you faint.  These symptoms may be an emergency. Get help right away. Call 911.  Do not wait to see if the symptoms will go away.  Do not drive yourself to the hospital.  This information is not intended to replace advice given to you by your health care provider. Make sure you discuss any questions you have with your health care provider.

## 2024-05-16 NOTE — ED PROVIDER NOTE - NSFOLLOWUPCLINICS_GEN_ALL_ED_FT
[facility]  Gastroenterology  [address] [place] [address]  Phone: [phone]  Fax: [phone]

## 2024-05-16 NOTE — ED PROVIDER NOTE - OBJECTIVE STATEMENT
53-year-old female PMH DM2, HLD presenting to emergency department with 6 weeks of nonbloody diarrhea.  Patient states she has approximately 5-6 episodes per day, always after she eats.  Has followed up with Dr. Way  and had negative stool studies, and was told to change diet.  States this has not helped, however diarrhea will stop when she takes loperamide.   Denies family history of colon CA, last colonoscopy was in 2022 without findings.  no recent antibiotics or travel.  Denies chest pain, shortness of breath, abdominal pain, melena, hematochezia, nausea, vomiting,  urinary symptoms, fever, chills, headache, other complaint.

## 2024-05-16 NOTE — ED PROVIDER NOTE - PROGRESS NOTE DETAILS
Labs nonactionable, red flag signs and symptoms as well as strict return precautions given to patient.  Patient verbalized understanding.  Patient aware she needs to follow-up with PMD and GI.

## 2024-09-03 ENCOUNTER — EMERGENCY (EMERGENCY)
Facility: HOSPITAL | Age: 54
LOS: 1 days | Discharge: HOME | End: 2024-09-03
Attending: STUDENT IN AN ORGANIZED HEALTH CARE EDUCATION/TRAINING PROGRAM
Payer: MEDICAID

## 2024-09-03 VITALS
OXYGEN SATURATION: 98 % | WEIGHT: 152.12 LBS | DIASTOLIC BLOOD PRESSURE: 68 MMHG | SYSTOLIC BLOOD PRESSURE: 135 MMHG | RESPIRATION RATE: 17 BRPM | HEART RATE: 88 BPM | TEMPERATURE: 98 F

## 2024-09-03 PROCEDURE — 99284 EMERGENCY DEPT VISIT MOD MDM: CPT

## 2024-09-04 VITALS
HEART RATE: 85 BPM | RESPIRATION RATE: 18 BRPM | DIASTOLIC BLOOD PRESSURE: 86 MMHG | SYSTOLIC BLOOD PRESSURE: 147 MMHG | TEMPERATURE: 98 F | OXYGEN SATURATION: 98 %

## 2024-09-04 PROCEDURE — 99283 EMERGENCY DEPT VISIT LOW MDM: CPT

## 2024-09-04 RX ORDER — CLINDAMYCIN PHOSPHATE 150 MG/ML
450 VIAL (ML) INJECTION ONCE
Refills: 0 | Status: COMPLETED | OUTPATIENT
Start: 2024-09-04 | End: 2024-09-04

## 2024-09-04 RX ORDER — CLINDAMYCIN PHOSPHATE 150 MG/ML
3 VIAL (ML) INJECTION
Qty: 63 | Refills: 0
Start: 2024-09-04 | End: 2024-09-10

## 2024-09-04 RX ADMIN — Medication 450 MILLIGRAM(S): at 01:46

## 2024-09-04 NOTE — ED ADULT NURSE NOTE - OBJECTIVE STATEMENT
pt c/o pain on the L buttocks. pt states she has a pimple on her left buttocks that she is unable to pop/touch. pt states pain/pimple began 3 days ago. pt states she is unable to sit down. pt states she has a hx of diabetes. pt denies fever

## 2024-09-04 NOTE — ED PROVIDER NOTE - CPE EDP CARDIAC NORM
[FreeTextEntry1] : The patient states he is less anxious than last visit, and no panic attacks since last visit. States he came to learn that the cousin brother who  suddenly had a rare blood disorder. States he is trying to proactively take care of his health issues.  He started individual psychotherapy. States he is taking HS gabapentin more frequently. He reported sleep and appetite: good.  No panic attacks since the last visit.  Patient denied any alcohol use, any substance abuse.  normal...

## 2024-09-04 NOTE — ED ADULT NURSE NOTE - NSFALLUNIVINTERV_ED_ALL_ED
Bed/Stretcher in lowest position, wheels locked, appropriate side rails in place/Call bell, personal items and telephone in reach/Instruct patient to call for assistance before getting out of bed/chair/stretcher/Non-slip footwear applied when patient is off stretcher/[place] to call system/Physically safe environment - no spills, clutter or unnecessary equipment/Purposeful proactive rounding/Room/bathroom lighting operational, light cord in reach

## 2024-09-04 NOTE — ED PROVIDER NOTE - OBJECTIVE STATEMENT
53-year-old female hx of DM, presenting with abscess over her L buttock x 3 days. Notes that it is painful. Tried to "pop" it - noted some bleeding. Mild fevers/chills. Denies any other symptoms.

## 2024-09-04 NOTE — ED PROVIDER NOTE - CLINICAL SUMMARY MEDICAL DECISION MAKING FREE TEXT BOX
53-year-old female hx of DM, presenting with abscess over her L buttock x 3 days. Normal vitals. Bedside sono demonstrated loculated fluid but no discrete drainable collection. Discussed possibility of performing labs and CT pelvis to rule out deep space infection - however patient prefers to trial PO abx as she feels well, low suspicion of deep infection at this time. Strict return precautions provided and patient is reliable.

## 2024-09-04 NOTE — ED PROVIDER NOTE - NSFOLLOWUPINSTRUCTIONS_ED_ALL_ED_FT
You were seen in the emergency department for: buttock rash  Your diagnosis for this visit was: abscess/cellulitis (skin infection)  From this ED visit you were prescribed: Clindamycin  We recommend you follow up with: your primary care doctor    Please return to the Emergency Department if you experience any of the following symptoms:   - Shortness of breath or trouble breathing  - Pressure, pain or tightness in the chest  - Face drooping, arm weakness or speech difficulty  - Persistence of severe vomiting  - Head injury or loss of consciousness  - Nonstop bleeding or an open wound    (1) Follow up with your primary care physician within the next 24-48 hours as discussed. In addition, we did not find evidence of a life threatening illness on your testing here today, but listed below are the specialists that will be necessary to see as an outpatient to continue the workup.  Please call the numbers listed below or [phone] to set up the necessary appointments.  (2) Take Tylenol (up to 1000mg or 1 g)  and/or Motrin (up to 600mg) up to every 6 hours as needed for pain.   (3) If you had an IV (intravenous) line placed, it was removed. Sometimes, after IV removal, that area can be tender for a few days; if it develops redness and swelling, those could be signs of infection; in which case, return to the Emergency Department for assessment.  (4) Please continue taking all of your home medications as directed.

## 2024-09-04 NOTE — ED PROVIDER NOTE - PATIENT PORTAL LINK FT
You can access the FollowMyHealth Patient Portal offered by [facility] by registering at the following website: [email or web address]. By joining [employer]’s FollowMyHealth portal, you will also be able to view your health information using other applications (apps) compatible with our system.